# Patient Record
Sex: MALE | Race: BLACK OR AFRICAN AMERICAN | NOT HISPANIC OR LATINO | Employment: UNEMPLOYED | URBAN - METROPOLITAN AREA
[De-identification: names, ages, dates, MRNs, and addresses within clinical notes are randomized per-mention and may not be internally consistent; named-entity substitution may affect disease eponyms.]

---

## 2023-10-17 ENCOUNTER — HOSPITAL ENCOUNTER (EMERGENCY)
Facility: CLINIC | Age: 58
Discharge: HOME | End: 2023-10-17
Attending: EMERGENCY MEDICINE | Admitting: EMERGENCY MEDICINE

## 2023-10-17 ENCOUNTER — APPOINTMENT (OUTPATIENT)
Dept: GENERAL RADIOLOGY | Facility: CLINIC | Age: 58
End: 2023-10-17
Attending: EMERGENCY MEDICINE

## 2023-10-17 VITALS
HEART RATE: 79 BPM | TEMPERATURE: 97.3 F | WEIGHT: 175 LBS | HEIGHT: 62 IN | DIASTOLIC BLOOD PRESSURE: 72 MMHG | OXYGEN SATURATION: 97 % | RESPIRATION RATE: 20 BRPM | SYSTOLIC BLOOD PRESSURE: 131 MMHG | BODY MASS INDEX: 32.2 KG/M2

## 2023-10-17 DIAGNOSIS — J45.901 EXACERBATION OF ASTHMA, UNSPECIFIED ASTHMA SEVERITY, UNSPECIFIED WHETHER PERSISTENT: ICD-10-CM

## 2023-10-17 LAB
ANION GAP SERPL CALCULATED.3IONS-SCNC: 10 MMOL/L (ref 7–15)
BASO+EOS+MONOS # BLD AUTO: NORMAL 10*3/UL
BASO+EOS+MONOS NFR BLD AUTO: NORMAL %
BASOPHILS # BLD AUTO: 0.1 10E3/UL (ref 0–0.2)
BASOPHILS NFR BLD AUTO: 1 %
BUN SERPL-MCNC: 13.3 MG/DL (ref 6–20)
CALCIUM SERPL-MCNC: 9.5 MG/DL (ref 8.6–10)
CHLORIDE SERPL-SCNC: 102 MMOL/L (ref 98–107)
CREAT SERPL-MCNC: 0.97 MG/DL (ref 0.67–1.17)
DEPRECATED HCO3 PLAS-SCNC: 26 MMOL/L (ref 22–29)
EGFRCR SERPLBLD CKD-EPI 2021: >90 ML/MIN/1.73M2
EOSINOPHIL # BLD AUTO: 0.7 10E3/UL (ref 0–0.7)
EOSINOPHIL NFR BLD AUTO: 12 %
ERYTHROCYTE [DISTWIDTH] IN BLOOD BY AUTOMATED COUNT: 14.7 % (ref 10–15)
FLUAV RNA SPEC QL NAA+PROBE: NEGATIVE
FLUBV RNA RESP QL NAA+PROBE: NEGATIVE
GLUCOSE SERPL-MCNC: 177 MG/DL (ref 70–99)
HCT VFR BLD AUTO: 43.5 % (ref 40–53)
HGB BLD-MCNC: 14.1 G/DL (ref 13.3–17.7)
HOLD SPECIMEN: NORMAL
HOLD SPECIMEN: NORMAL
IMM GRANULOCYTES # BLD: 0 10E3/UL
IMM GRANULOCYTES NFR BLD: 0 %
LYMPHOCYTES # BLD AUTO: 2 10E3/UL (ref 0.8–5.3)
LYMPHOCYTES NFR BLD AUTO: 33 %
MCH RBC QN AUTO: 29.3 PG (ref 26.5–33)
MCHC RBC AUTO-ENTMCNC: 32.4 G/DL (ref 31.5–36.5)
MCV RBC AUTO: 90 FL (ref 78–100)
MONOCYTES # BLD AUTO: 0.6 10E3/UL (ref 0–1.3)
MONOCYTES NFR BLD AUTO: 10 %
NEUTROPHILS # BLD AUTO: 2.8 10E3/UL (ref 1.6–8.3)
NEUTROPHILS NFR BLD AUTO: 44 %
NRBC # BLD AUTO: 0 10E3/UL
NRBC BLD AUTO-RTO: 0 /100
PLATELET # BLD AUTO: 231 10E3/UL (ref 150–450)
POTASSIUM SERPL-SCNC: 3.6 MMOL/L (ref 3.4–5.3)
RBC # BLD AUTO: 4.81 10E6/UL (ref 4.4–5.9)
RSV RNA SPEC NAA+PROBE: NEGATIVE
SARS-COV-2 RNA RESP QL NAA+PROBE: NEGATIVE
SODIUM SERPL-SCNC: 138 MMOL/L (ref 135–145)
TROPONIN T SERPL HS-MCNC: 7 NG/L
WBC # BLD AUTO: 6.3 10E3/UL (ref 4–11)

## 2023-10-17 PROCEDURE — 250N000009 HC RX 250: Performed by: EMERGENCY MEDICINE

## 2023-10-17 PROCEDURE — 85025 COMPLETE CBC W/AUTO DIFF WBC: CPT | Performed by: EMERGENCY MEDICINE

## 2023-10-17 PROCEDURE — 80048 BASIC METABOLIC PNL TOTAL CA: CPT | Performed by: EMERGENCY MEDICINE

## 2023-10-17 PROCEDURE — 84484 ASSAY OF TROPONIN QUANT: CPT | Performed by: EMERGENCY MEDICINE

## 2023-10-17 PROCEDURE — 93005 ELECTROCARDIOGRAM TRACING: CPT

## 2023-10-17 PROCEDURE — 94640 AIRWAY INHALATION TREATMENT: CPT

## 2023-10-17 PROCEDURE — 87637 SARSCOV2&INF A&B&RSV AMP PRB: CPT | Performed by: EMERGENCY MEDICINE

## 2023-10-17 PROCEDURE — 71046 X-RAY EXAM CHEST 2 VIEWS: CPT

## 2023-10-17 PROCEDURE — 36415 COLL VENOUS BLD VENIPUNCTURE: CPT | Performed by: EMERGENCY MEDICINE

## 2023-10-17 PROCEDURE — 99285 EMERGENCY DEPT VISIT HI MDM: CPT | Mod: 25

## 2023-10-17 PROCEDURE — 250N000012 HC RX MED GY IP 250 OP 636 PS 637: Performed by: EMERGENCY MEDICINE

## 2023-10-17 RX ORDER — PREDNISONE 20 MG/1
TABLET ORAL
Qty: 10 TABLET | Refills: 0 | Status: SHIPPED | OUTPATIENT
Start: 2023-10-17

## 2023-10-17 RX ORDER — IPRATROPIUM BROMIDE AND ALBUTEROL SULFATE 2.5; .5 MG/3ML; MG/3ML
3 SOLUTION RESPIRATORY (INHALATION) ONCE
Status: COMPLETED | OUTPATIENT
Start: 2023-10-17 | End: 2023-10-17

## 2023-10-17 RX ORDER — PREDNISONE 20 MG/1
40 TABLET ORAL ONCE
Status: COMPLETED | OUTPATIENT
Start: 2023-10-17 | End: 2023-10-17

## 2023-10-17 RX ADMIN — IPRATROPIUM BROMIDE AND ALBUTEROL SULFATE 3 ML: .5; 3 SOLUTION RESPIRATORY (INHALATION) at 19:35

## 2023-10-17 RX ADMIN — PREDNISONE 40 MG: 20 TABLET ORAL at 19:35

## 2023-10-17 ASSESSMENT — ACTIVITIES OF DAILY LIVING (ADL): ADLS_ACUITY_SCORE: 35

## 2023-10-17 NOTE — ED TRIAGE NOTES
Pt presents for evaluation of shortness of breath with a congested cough, fevers and chest pain. Has been using his albuterol inhaler and a Primatene mist inhaler, but isn't helping. Symptoms started yesterday. Hx of asthma.

## 2023-10-18 LAB
ATRIAL RATE - MUSE: 79 BPM
DIASTOLIC BLOOD PRESSURE - MUSE: NORMAL MMHG
INTERPRETATION ECG - MUSE: NORMAL
P AXIS - MUSE: 73 DEGREES
PR INTERVAL - MUSE: 158 MS
QRS DURATION - MUSE: 84 MS
QT - MUSE: 346 MS
QTC - MUSE: 396 MS
R AXIS - MUSE: 70 DEGREES
SYSTOLIC BLOOD PRESSURE - MUSE: NORMAL MMHG
T AXIS - MUSE: 58 DEGREES
VENTRICULAR RATE- MUSE: 79 BPM

## 2023-10-18 NOTE — DISCHARGE INSTRUCTIONS
2 puff of albuterol inhaler every 4 hours for next 3-5 days  Next dose of prednisone tomorrow  Return if feeling worse

## 2023-10-18 NOTE — ED PROVIDER NOTES
"  History     Chief Complaint:  Cough and Shortness of Breath    The history is provided by the patient and a relative.      Roman Pearl is a 58 year old male with a history of asthma who presents with productive cough and shortness of breath starting yesterday (10/16/23). The patient has a prescription Albuterol inhaler and an OTC epi-mist inhaler that he reportedly uses 4-5 times per day. Today the patient used his epi mist inhaler 10 times. Roman states that nothing in particular causes his asthma to flare up. Denies fever, vomiting, diarrhea, or any known ill contacts. Roman has not taken an at home Covid test. Outside of this, he normally gets his care in [place]. He is not on any other medications and denies any other medical history. No allergies.     Independent Historian:   Spouse - supplemented history    Review of External Notes:   None.     Medications:    Albuterol   Epi mist OTC inhaler    Past Medical History:    Asthma    Physical Exam   Patient Vitals for the past 24 hrs:   BP Temp Temp src Pulse Resp SpO2 Height Weight   10/17/23 2017 -- -- -- -- -- 97 % -- --   10/17/23 1949 131/72 -- -- 79 -- 98 % -- --   10/17/23 1833 (!) 145/97 97.3  F (36.3  C) Oral 88 20 98 % 1.575 m (5' 2\") 79.4 kg (175 lb)        Physical Exam  Constitutional:       Appearance: He is well-developed.   HENT:      Right Ear: External ear normal.      Left Ear: External ear normal.      Mouth/Throat:      Mouth: Mucous membranes are moist.      Pharynx: Oropharynx is clear. No oropharyngeal exudate or posterior oropharyngeal erythema.   Eyes:      General: No scleral icterus.     Extraocular Movements: Extraocular movements intact.      Conjunctiva/sclera: Conjunctivae normal.      Pupils: Pupils are equal, round, and reactive to light.   Cardiovascular:      Rate and Rhythm: Normal rate and regular rhythm.      Heart sounds: Normal heart sounds. No murmur heard.     No friction rub. No gallop.   Pulmonary:      Effort: " Pulmonary effort is normal. No respiratory distress.      Breath sounds: No stridor. Wheezing present. No rhonchi or rales.      Comments: Diffuse exp and insp wheezing on exam  Chest:      Chest wall: No tenderness.   Abdominal:      General: Bowel sounds are normal. There is no distension.      Palpations: Abdomen is soft. There is no mass.      Tenderness: There is no abdominal tenderness.   Musculoskeletal:         General: Normal range of motion.   Skin:     General: Skin is warm and dry.      Capillary Refill: Capillary refill takes less than 2 seconds.      Findings: No rash.   Neurological:      Mental Status: He is alert.           Emergency Department Course   ECG  ECG taken at 1846, ECG read at 1917  Normal sinus rhythm  Normal ECG   Rate 79 bpm. IN interval 158 ms. QRS duration 84 ms. QT/QTc 346/396 ms. P-R-T axes 73 70 58.     Imaging:  XR Chest 2 Views   Final Result   IMPRESSION: No focal airspace consolidation. No pleural effusion or pneumothorax.      Cardiomediastinal silhouette is normal.         Laboratory:  Labs Ordered and Resulted from Time of ED Arrival to Time of ED Departure   BASIC METABOLIC PANEL - Abnormal       Result Value    Sodium 138      Potassium 3.6      Chloride 102      Carbon Dioxide (CO2) 26      Anion Gap 10      Urea Nitrogen 13.3      Creatinine 0.97      GFR Estimate >90      Calcium 9.5      Glucose 177 (*)    INFLUENZA A/B, RSV, & SARS-COV2 PCR - Normal    Influenza A PCR Negative      Influenza B PCR Negative      RSV PCR Negative      SARS CoV2 PCR Negative     TROPONIN T, HIGH SENSITIVITY - Normal    Troponin T, High Sensitivity 7     CBC WITH PLATELETS AND DIFFERENTIAL    WBC Count 6.3      RBC Count 4.81      Hemoglobin 14.1      Hematocrit 43.5      MCV 90      MCH 29.3      MCHC 32.4      RDW 14.7      Platelet Count 231      % Neutrophils 44      % Lymphocytes 33      % Monocytes 10      Mids % (Monos, Eos, Basos)        % Eosinophils 12      % Basophils 1       % Immature Granulocytes 0      NRBCs per 100 WBC 0      Absolute Neutrophils 2.8      Absolute Lymphocytes 2.0      Absolute Monocytes 0.6      Mids Abs (Monos, Eos, Basos)        Absolute Eosinophils 0.7      Absolute Basophils 0.1      Absolute Immature Granulocytes 0.0      Absolute NRBCs 0.0        Emergency Department Course & Assessments:       Interventions:  Medications   predniSONE (DELTASONE) tablet 40 mg (40 mg Oral $Given 10/17/23 1935)   ipratropium - albuterol 0.5 mg/2.5 mg/3 mL (DUONEB) neb solution 3 mL (3 mLs Nebulization $Given 10/17/23 1935)   ipratropium - albuterol 0.5 mg/2.5 mg/3 mL (DUONEB) neb solution 3 mL (3 mLs Nebulization $Given 10/17/23 1935)      Assessments:  1906 I obtained history and examined the patient as noted above.     Independent Interpretation (X-rays, CTs, rhythm strip):  None    Social Determinants of Health affecting care:   None    Disposition:  The patient was discharged to home.     Impression & Plan      Medical Decision Making:    Roman Pearl is a 58 year old male with a PMH of asthma who presents for evaluation of shortness of breath and wheezing.  Signs and symptoms are consistent with asthma exacerbation.  A broad differential was considered including foreign body, asthma, pneumonia, bronchitis, reactive airway disease, pneumothorax, cardiac equivalent, viral induced wheezing, allergic phenomena, etc.  He feels improved after interventions here in ED.  There are no signs at this point of any serious etiologies including those mentioned above.  No indication for hospitalization at this time including no hypoxia, no marked increase in respiratory rate, minimal to no retractions.   Supportive outpatient management is indicated, medications for discharge noted above.  Close follow up with primary care physician.  Return if increased wheezing, progressive shortness of breath, develops fever greater than 102.  5 days of prednisone prescribed.    Diagnosis:    ICD-10-CM     1. Exacerbation of asthma, unspecified asthma severity, unspecified whether persistent  J45.901            Discharge Medications:  Discharge Medication List as of 10/17/2023  8:21 PM        START taking these medications    Details   predniSONE (DELTASONE) 20 MG tablet Take two tablets (= 40mg) each day for 5 (five) days, Disp-10 tablet, R-0, E-Prescribe            Scribe Disclosure:  IJamar, am serving as a scribe at 8:42 PM on 10/17/2023    I, Veronica Zamorano, am serving as a scribe at 8:42 PM on 10/17/2023 to document services personally performed by Mary Patrick MD, based on my observations and the provider's statements to me.   10/17/2023   Mary Patrick MD Cheng, Wenlan, MD  10/17/23 3926

## 2024-10-30 ENCOUNTER — APPOINTMENT (OUTPATIENT)
Dept: GENERAL RADIOLOGY | Facility: CLINIC | Age: 59
End: 2024-10-30
Attending: EMERGENCY MEDICINE
Payer: COMMERCIAL

## 2024-10-30 ENCOUNTER — HOSPITAL ENCOUNTER (EMERGENCY)
Facility: CLINIC | Age: 59
Discharge: HOME | End: 2024-10-30
Attending: EMERGENCY MEDICINE | Admitting: EMERGENCY MEDICINE
Payer: COMMERCIAL

## 2024-10-30 VITALS
HEART RATE: 64 BPM | DIASTOLIC BLOOD PRESSURE: 96 MMHG | RESPIRATION RATE: 18 BRPM | WEIGHT: 177 LBS | SYSTOLIC BLOOD PRESSURE: 143 MMHG | OXYGEN SATURATION: 100 % | BODY MASS INDEX: 32.37 KG/M2 | TEMPERATURE: 97.5 F

## 2024-10-30 DIAGNOSIS — M54.50 BACK PAIN, LUMBOSACRAL: ICD-10-CM

## 2024-10-30 PROCEDURE — 250N000013 HC RX MED GY IP 250 OP 250 PS 637: Performed by: BEHAVIOR TECHNICIAN

## 2024-10-30 PROCEDURE — 99283 EMERGENCY DEPT VISIT LOW MDM: CPT

## 2024-10-30 PROCEDURE — 73502 X-RAY EXAM HIP UNI 2-3 VIEWS: CPT

## 2024-10-30 RX ORDER — OXYCODONE HYDROCHLORIDE 5 MG/1
5 TABLET ORAL ONCE
Status: COMPLETED | OUTPATIENT
Start: 2024-10-30 | End: 2024-10-30

## 2024-10-30 RX ORDER — LIDOCAINE 4 G/G
1 PATCH TOPICAL ONCE
Status: DISCONTINUED | OUTPATIENT
Start: 2024-10-30 | End: 2024-10-30 | Stop reason: HOSPADM

## 2024-10-30 RX ADMIN — LIDOCAINE 1 PATCH: 4 PATCH TOPICAL at 06:28

## 2024-10-30 RX ADMIN — OXYCODONE HYDROCHLORIDE 5 MG: 5 TABLET ORAL at 06:29

## 2024-10-30 ASSESSMENT — COLUMBIA-SUICIDE SEVERITY RATING SCALE - C-SSRS
1. IN THE PAST MONTH, HAVE YOU WISHED YOU WERE DEAD OR WISHED YOU COULD GO TO SLEEP AND NOT WAKE UP?: NO
2. HAVE YOU ACTUALLY HAD ANY THOUGHTS OF KILLING YOURSELF IN THE PAST MONTH?: NO
6. HAVE YOU EVER DONE ANYTHING, STARTED TO DO ANYTHING, OR PREPARED TO DO ANYTHING TO END YOUR LIFE?: NO

## 2024-10-30 ASSESSMENT — ACTIVITIES OF DAILY LIVING (ADL): ADLS_ACUITY_SCORE: 0

## 2024-10-30 NOTE — DISCHARGE INSTRUCTIONS
Please follow-up with [facility] orthopedics if symptoms persist.  You can take Tylenol/ibuprofen as needed for pain.  Please return to the ED with ongoing pain, weakness, numbness, bladder or bowel incontinence, inability to walk, or any other concerning symptoms.

## 2024-10-30 NOTE — ED TRIAGE NOTES
Pt here with c/o R hip pain after having a fall two weeks ago. Pt states he has been taking tylenol and ibuprofen and has been able to walk, however the pain has become significantly worse with time.

## 2024-10-30 NOTE — ED PROVIDER NOTES
Emergency Department Attending Supervision Note  10/30/2024  7:16 AM    I evaluated this patient in conjunction with Charlie Wilson PA-C  I have participated in the care of the patient and personally performed key elements of the history, exam, and medical decision making.      HPI:   Roman Pearl is a 59 year old male who presents due to right hip and back pain.  The patient states that he fell 2 weeks ago.  He notes that he seemed to fall backwards and towards his side, mostly landing on his right hip area.  He presents due to pain.  No current problems walking.    Independent Historian:   None    Review of External Notes:   None      EXAM:    MSK: No midline lumbar spine tenderness.  Normal range of motion of the right lower extremity.  There is focal right hip/trochanteric area tenderness.      Assessments, Consultations/Discussion of Management or Tests, Independent Image interpretation     ED Course as of 10/30/24 0716   Wed Oct 30, 2024   0622 I evaluated patient and obtained history.    0645 Reassessed patient.  Discussed discharge plans.         Optional/Additional Documentation: None     MEDICAL DECISION MAKING/ASSESSMENT AND PLAN:   X-ray negative.  Patient did not want further advanced imaging.  Comfortable with symptomatic treatment and discharged home.    Impression:    ICD-10-CM    1. Back pain, lumbosacral  M54.50              DISPOSITION:  ROLY Dent, DO  10/30/2024  [facility] EMERGENCY DEPT         Winston Dent, DO  10/30/24 0719

## 2024-10-30 NOTE — ED PROVIDER NOTES
Emergency Department Note      History of Present Illness     Chief Complaint   Hip Pain      HPI   Roman Pearl is a 59 year old male who presents to the ED for evaluation of hip pain.  Patient reports that he tripped and fell 2 weeks ago while walking outside his neighborhood.  He fell forward landing on his knees and right side.  He injured his right hip and the right side of his right back.  He denies hitting his head or losing consciousness.  He is not on blood thinners.  He states that the pain was initially manageable however, last night the pain was worse and had trouble walking this morning.  He has been taking Tylenol for the pain with minimal relief.  He denies any shooting pain, numbness, or tingling of the lower extremities.  He denies bladder or bowel incontinence.  He denies any chest pain or shortness of breath.  He denies any other injuries.      Independent Historian   None    Review of External Notes   Reviewed physical therapy note from 10/15/2024.  Patient was seen for low back pain.    Past Medical History     Medical History and Problem List   No past medical history on file.    Medications   predniSONE (DELTASONE) 20 MG tablet        Surgical History   No past surgical history on file.    Physical Exam     Patient Vitals for the past 24 hrs:   BP Temp Pulse Resp SpO2 Weight   10/30/24 0625 -- -- -- -- 100 % --   10/30/24 0623 (!) 143/96 -- 64 -- -- --   10/30/24 0348 (!) 145/107 97.5  F (36.4  C) 63 18 100 % 80.3 kg (177 lb)     Physical Exam  Physical Exam:  General: lying comfortably on hospital bed  Head: normocephalic, atraumatic  Eyes: PERRLA, EOMI  Ears: External ears appear normal.   Nose: no signs of bleeding   Throat: moist mucous membranes  Neck: No JVD  CV: regular rate and rhythm  Pulm: lungs clear to ausculation bilaterally, normal respiratory effort, normal chest expansion with breathing   Abdomen: soft, non-tender, non-distended  MSK: No midline tenderness.  Mild tenderness to  palpation of the right lumbosacral region.  Ext: normal range of motion of all extremities. No gross deformities.  Normal sensation to light touch throughout lower extremities.  Pedal pulses intact.  Skin: warm, dry, no rashes  Neuro: alert and oriented  Psych: Appropriate mood. Cooperative      Diagnostics     Lab Results   Labs Ordered and Resulted from Time of ED Arrival to Time of ED Departure - No data to display    Imaging   XR Pelvis w Hip Right 1 View   Final Result   IMPRESSION: Partially visualized degenerative change at the lumbosacral junction. No fracture identified at the hip or the pelvis. Joint spaces at the hips, SI joints, and symphysis pubis are preserved.          EKG   None    Independent Interpretation   X-ray right hip shows no acute fracture or dislocation.    ED Course      Medications Administered   Medications   oxyCODONE (ROXICODONE) tablet 5 mg (5 mg Oral $Given 10/30/24 0629)       Procedures   Procedures     Discussion of Management   None    ED Course   ED Course as of 10/30/24 1212   Wed Oct 30, 2024   0622 I evaluated patient and obtained history.    0645 Reassessed patient.  Discussed discharge plans.       Additional Documentation  None    Medical Decision Making / Diagnosis     CMS Diagnoses: None    MIPS       None    ITZEL Owens MARILOU Mccanno is a 59 year old male who presents to the ED for evaluation of hip pain after a fall.  Patient reports that he had a fall 2 weeks ago and injured his right hip and right low back.  See further HPI details above.  The above workup was undertaken.  Broad differential was considered including hip fracture, dislocation, muscle strain, vertebral compression fracture, contusion.  On exam, patient is well-appearing.  His vitals are reassuring.  No obvious deformity on exam.  He does have tenderness to the right lumbosacral region.  X-ray of the hip is reassuring without evidence of acute fracture or dislocation.  I did offer patient imaging of his back to  rule out a fracture however he states that he feels okay and will like to defer this to his primary clinic or outpatient orthopedics.  He was given pain meds and lidocaine patch with much improvement.  He was able to ambulate in the ED.  No red flag symptoms that warrant emergent scan of his back.  Discussed follow-up with his primary care provider or orthopedics if symptoms persist.  Discussed supportive cares including Tylenol/ibuprofen, rest, and icing the area.  Patient feels comfortable with this plan and is okay to discharge at this time.    Disposition   The patient was discharged.     Diagnosis     ICD-10-CM    1. Back pain, lumbosacral  M54.50            Discharge Medications   Discharge Medication List as of 10/30/2024  6:45 AM            SHARITA Duff Kausar, PA-C  10/30/24 1215